# Patient Record
Sex: MALE | Race: WHITE
[De-identification: names, ages, dates, MRNs, and addresses within clinical notes are randomized per-mention and may not be internally consistent; named-entity substitution may affect disease eponyms.]

---

## 2019-12-23 ENCOUNTER — HOSPITAL ENCOUNTER (INPATIENT)
Dept: HOSPITAL 84 - D.PSYCH | Age: 84
LOS: 25 days | Discharge: NURSING, REHAB OR LONG-TERM CARE | DRG: 57 | End: 2020-01-17
Attending: PSYCHIATRY & NEUROLOGY | Admitting: PSYCHIATRY & NEUROLOGY
Payer: MEDICARE

## 2019-12-23 VITALS — BODY MASS INDEX: 21.4 KG/M2 | WEIGHT: 171.61 LBS | BODY MASS INDEX: 21.5 KG/M2

## 2019-12-23 DIAGNOSIS — R54: ICD-10-CM

## 2019-12-23 DIAGNOSIS — F41.8: ICD-10-CM

## 2019-12-23 DIAGNOSIS — N40.0: ICD-10-CM

## 2019-12-23 DIAGNOSIS — I48.91: ICD-10-CM

## 2019-12-23 DIAGNOSIS — K21.9: ICD-10-CM

## 2019-12-23 DIAGNOSIS — R63.0: ICD-10-CM

## 2019-12-23 DIAGNOSIS — K59.00: ICD-10-CM

## 2019-12-23 DIAGNOSIS — F02.81: ICD-10-CM

## 2019-12-23 DIAGNOSIS — M19.90: ICD-10-CM

## 2019-12-23 DIAGNOSIS — I12.9: ICD-10-CM

## 2019-12-23 DIAGNOSIS — N18.3: ICD-10-CM

## 2019-12-23 DIAGNOSIS — G30.1: Primary | ICD-10-CM

## 2019-12-23 DIAGNOSIS — G47.00: ICD-10-CM

## 2019-12-23 DIAGNOSIS — R13.10: ICD-10-CM

## 2019-12-24 VITALS — SYSTOLIC BLOOD PRESSURE: 148 MMHG | DIASTOLIC BLOOD PRESSURE: 82 MMHG

## 2019-12-24 VITALS — SYSTOLIC BLOOD PRESSURE: 158 MMHG | DIASTOLIC BLOOD PRESSURE: 78 MMHG

## 2019-12-24 VITALS — DIASTOLIC BLOOD PRESSURE: 93 MMHG | SYSTOLIC BLOOD PRESSURE: 174 MMHG

## 2019-12-24 LAB
ALBUMIN SERPL-MCNC: 3.1 G/DL (ref 3.4–5)
ALP SERPL-CCNC: 115 U/L (ref 46–116)
ALT SERPL-CCNC: 22 U/L (ref 10–68)
AMORPHOUS SEDIMENT: (no result) /LPF
ANION GAP SERPL CALC-SCNC: 17.4 MMOL/L (ref 8–16)
APPEARANCE UR: (no result)
BACTERIA #/AREA URNS HPF: (no result) /HPF
BASOPHILS NFR BLD AUTO: 0.3 % (ref 0–2)
BILIRUB SERPL-MCNC: 0.82 MG/DL (ref 0.2–1.3)
BILIRUB SERPL-MCNC: NEGATIVE MG/DL
BUN SERPL-MCNC: 37 MG/DL (ref 7–18)
CALCIUM SERPL-MCNC: 8.4 MG/DL (ref 8.5–10.1)
CHLORIDE SERPL-SCNC: 109 MMOL/L (ref 98–107)
CHOLEST/HDLC SERPL: 3.8 RATIO (ref 2.3–4.9)
CO2 SERPL-SCNC: 18 MMOL/L (ref 21–32)
COLOR UR: YELLOW
CREAT SERPL-MCNC: 1.7 MG/DL (ref 0.6–1.3)
EOSINOPHIL NFR BLD: 1.2 % (ref 0–7)
ERYTHROCYTE [DISTWIDTH] IN BLOOD BY AUTOMATED COUNT: 14.9 % (ref 11.5–14.5)
EST. AVERAGE GLUCOSE BLD GHB EST-MCNC: 103 MG/DL (ref 74–154)
GLOBULIN SER-MCNC: 3.7 G/L
GLUCOSE SERPL-MCNC: 75 MG/DL (ref 74–106)
GLUCOSE SERPL-MCNC: NEGATIVE MG/DL
GRAN CASTS #/AREA URNS LPF: (no result) /LPF
HCT VFR BLD CALC: 40.6 % (ref 42–54)
HDLC SERPL-MCNC: 26 MG/DL (ref 32–96)
HGB BLD-MCNC: 13.7 G/DL (ref 13.5–17.5)
IMM GRANULOCYTES NFR BLD: 0.9 % (ref 0–5)
KETONES UR STRIP-MCNC: (no result) MG/DL
LDL-HDL RATIO: 2.3 RATIO (ref 1.5–3.5)
LDLC SERPL-MCNC: 61 MG/DL (ref 0–100)
LYMPHOCYTES NFR BLD AUTO: 17.9 % (ref 15–50)
MCH RBC QN AUTO: 31.6 PG (ref 26–34)
MCHC RBC AUTO-ENTMCNC: 33.7 G/DL (ref 31–37)
MCV RBC: 93.5 FL (ref 80–100)
MONOCYTES NFR BLD: 6.7 % (ref 2–11)
MUCOUS THREADS #/AREA URNS LPF: (no result) /LPF
NEUTROPHILS NFR BLD AUTO: 73 % (ref 40–80)
NITRITE UR-MCNC: NEGATIVE MG/ML
OSMOLALITY SERPL CALC.SUM OF ELEC: 288 MOSM/KG (ref 275–300)
PH UR STRIP: 5 [PH] (ref 5–6)
PLATELET # BLD: 173 10X3/UL (ref 130–400)
PMV BLD AUTO: 9.5 FL (ref 7.4–10.4)
POTASSIUM SERPL-SCNC: 3.4 MMOL/L (ref 3.5–5.1)
PROT SERPL-MCNC: 6.8 G/DL (ref 6.4–8.2)
PROT UR-MCNC: NEGATIVE MG/DL
RBC # BLD AUTO: 4.34 10X6/UL (ref 4.2–6.1)
RBC #/AREA URNS HPF: (no result) /HPF (ref 0–5)
SODIUM SERPL-SCNC: 141 MMOL/L (ref 136–145)
SP GR UR STRIP: 1.01 (ref 1–1.02)
SQUAMOUS #/AREA URNS HPF: (no result) /HPF (ref 0–5)
TRIGL SERPL-MCNC: 59 MG/DL (ref 30–200)
TSH SERPL-ACNC: 5.16 UIU/ML (ref 0.36–3.74)
UROBILINOGEN UR-MCNC: NORMAL MG/DL
WBC # BLD AUTO: 9.7 10X3/UL (ref 4.8–10.8)
WBC #/AREA URNS HPF: (no result) /HPF

## 2019-12-24 NOTE — NUR
NEW ADMIT TO DOCTOR RODRIGUEZ FOR [facility] RELATED TO ALTERED MENTAL STATUS.
RECEIVED FROM [facility]. PATIENT REPORTEDLY COMBATIVE DURING TRANSPORT
 BUT CALM UPON ARRIVAL TO [facility]. PATIENT DAUGHT CALLED AND ADMIT CONSENT
RECEIVED. DNR STATUS RECEIVED. CODE WORD OF CRUISIN RECEIVED. PATIENT BECAUSE
INCREASING AGITATED AND ANXIOUS AFTER ARRIVAL. ATTEMPTS TO STAND WITHOUT
ASSIST. UNABLE TO REDIRECT. PRN ATIVAN 0.5 MG IM GIVEN FOR ANXIETY AND PRM
HALDOL 2 MG IM GIVEN FOR ONSET OF UNSAFE PSYCHOTIC BEHAVIOUR. CONTINUE TO
MONITOR FOR SAFETY.

## 2019-12-24 NOTE — NUR
B) Patient is alert and oriented to self, very needy and attention seeking,
calling out to staff constantly wanting someone to do something for him
constantly
I) Administered scheduled medications as ordered, redirected as needed,
R) Mediation compliant, intrusive and needy,
P) Continue plan of care.

## 2019-12-24 NOTE — NUR
PT IS AWAKE AND ALERT TO SELF ONLY. CALM AND COOPERATIVE WITH ASSESSMENT.
PRESCRIBED MEDS PROVIDED AS ORDERED. MED COMPLIANT. PT CAN BE VERY DEMANDING
AT TIMES WITH STAFF. PT YELLS OUT FOR NO REASON. HARD TO REDIRECT AT TIMES.
REDIRECT AND REORIENT AS NEEDED. FALL PRECAUTIONS IN PLACE. WILL CPOC.

## 2019-12-25 VITALS — SYSTOLIC BLOOD PRESSURE: 171 MMHG | DIASTOLIC BLOOD PRESSURE: 81 MMHG

## 2019-12-25 VITALS — DIASTOLIC BLOOD PRESSURE: 81 MMHG | SYSTOLIC BLOOD PRESSURE: 139 MMHG

## 2019-12-25 NOTE — NUR
The patient is confused. He has poor insight into his situation. He tries to
stand to walk, but he is unable to walk and he has fallen before coming to
[facility]. He asks a lot of questions and wants to be taken care of at that
time. He is in a edwin chair and needs assistance to transfer. Provide
prescribed meds. Continue POC.

## 2019-12-25 NOTE — NUR
The patient said he needed to go to the bathroom right now. Took the patient
to his room and he had explosive diarrhea in his pants and after he sat on the
toilet. Obtained a CDT sample and sent it to the lab.

## 2019-12-25 NOTE — PSY
PATIENT NAME:RAGHU TURNER                               MEDICAL RECORD: [id number]
: 30                                              LOCATION:[facility]
ADMISSION DATE: 19    ACCOUNT: [id number]
                                                           
PSYCHIATRIC EVALUATION
 
 
DATE OF EVALUATION: 19
 
 
IDENTIFYING DATA:  The patient is 89 years old and he is admitted to the
hospital on a voluntary basis.
 
CHIEF COMPLAINT:  Aggression.
 
HISTORY OF PRESENT ILLNESS:  The patient lives in a local nursing home and he
became combative and aggressive with staff.  The patient himself has no
recollection of this.  He was sent to the hospital for evaluation and treatment
of these symptoms and was very aggressive and disorganized and required p.r.n.
medication to calm him upon admission.  Today, he is more cooperative.  He is
certainly calm, but he is denying that he would seek to harm himself.  He also
has no thoughts of harming others and has no recollection for the circumstances
that brought him here.
 
PAST MEDICAL HISTORY:  Significant for hypertension, atrial fibrillation,
chronic back pain, benign prostatic hypertrophy.
 
PAST PSYCHIATRIC HISTORY:  Significant for an established diagnosis of dementia,
although I do not know who, when or how that diagnosis was made.
 
FAMILY HISTORY:  Unknown.
 
ALLERGIES:  AMBIEN, PENICILLIN AND BENADRYL.
 
CURRENT MEDICATIONS:  Include Atenolol, Levaquin, lisinopril, Remeron, Zoloft,
melatonin, and Mucinex.
 
SOCIAL HISTORY:  The patient is .  He does have adult children involved
with his care.  He is not able to provide much in the way of background history.
 He does not smoke.  He does drink, but he denies it was ever excessive.
 
MENTAL STATUS EXAMINATION:  The patient is awake, alert and oriented to person,
place and somewhat to time and situation.  His mood is flat.  His affect is
constricted.  Thought processes are circumstantial.  Memory, concentration, and
abstraction abilities are moderately impaired and he denies any intent to harm
himself or others as well as psychotic symptoms.
 
ASSETS:  Supportive family members.
 
LIABILITIES:  Limited insight.
 
DIAGNOSTIC IMPRESSION:
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
disturbances.
AXIS II:  None.
AXIS III:  Hypertension, atrial fibrillation, chronic back pain,
gastroesophageal reflux disease, benign prostatic hypertrophy.
AXIS IV:  Moderate.
 
AXIS V:  Global assessment of functioning is 30.
 
PLAN:  At this time, the patient is admitted to the hospital secondary to
aggressive behavior associated with a dementing illness.  He will be
comprehensively evaluated from both a medical, psychological, and social
standpoint.  His long-term prognosis is guarded.
 
TRANSINT:[id number] Voice Confirmation ID: [id number] DOCUMENT ID: [id number]
                                           
                                           FREDDIE RODRIGUEZ MD             
 
 
 
Electronically Signed by FREDDIE RODRIGUEZ on 19 at 0837
 
 
 
 
 
 
 
 
 
 
 
 
 
 
 
 
 
 
 
 
 
 
 
 
 
 
 
 
 
 
 
 
 
 
 
 
 
 
CC:                                                             [id number]
DICTATION DATE: 19 1345     :     19 1404      ADM IN  
                                                                              
[address] [place] [address]

## 2019-12-26 VITALS — SYSTOLIC BLOOD PRESSURE: 173 MMHG | DIASTOLIC BLOOD PRESSURE: 97 MMHG

## 2019-12-26 VITALS — DIASTOLIC BLOOD PRESSURE: 79 MMHG | SYSTOLIC BLOOD PRESSURE: 130 MMHG

## 2019-12-26 NOTE — PN
PATIENT:RAGHU TURNER                           MEDICAL RECORD: [id number]
                                                         LOCATION:[facility]
                                                         ADMISSION DATE: 12/23/19
 
PROGRESS NOTE
 
 
DATE OF SERVICE:  12/25/2019
 
SUBJECTIVE:  The patient's case was discussed with staff.  He has no new
complaint.
 
OBJECTIVE:  The patient is in good behavioral control.  He has limited insight
about his condition.  He tolerates his medicines well.  He is not eating
adequately and did not sleep well last night.
 
ASSESSMENT:  Dementia.
 
PLAN:  The patient is going to be started on Megace to assist with appetite
stimulation and I am going to give him trazodone to assist with sleep
consolidation.
 
TRANSINT:[id number] Voice Confirmation ID: [id number] DOCUMENT ID: [id number]
 
 
 
 
                                           
                                           FREDDIE RODRIGUEZ MD             
 
 
 
Electronically Signed by FREDDIE RODRIGUEZ on 12/26/19 at 1522
 
 
 
 
 
 
 
 
 
 
 
 
 
 
 
 
 
 
CC:                                                             [id number]
DICTATION DATE: 12/25/19 1018     :     12/25/19 1037      [facility] [place]  
                                                                              
[facility]                                          
[address] [place] [address]

## 2019-12-26 NOTE — NUR
PATIENT IS VERY CONFUSED, HOLLERING OUT, CAN BE LITTLE COMBATIVE AT TIMES, HE
IS DEMANDING, HE IS COMPLIANT WITH MEDS, NO SIDE EFFECTS NOTED. TOTALLY
DEPENDENT WITH CARE. CAN MAKE HIS NEEDS KNOWN. WILL FOLLOW POC

## 2019-12-26 NOTE — NUR
PATIENT CALM, CONFUSED, COOPERATIVE, DEMANDING AT TIMES, NO AGGRESSION NOTED,
YELLS AT TIMES,  MEDS ADMIN PER ORDERS WITH COMPLETE MED COMPLIANCE NOTED.
CONT POC AS DIRECTED.

## 2019-12-26 NOTE — NUR
Nutrition Follow-up:
Diet: Regular
PO intake: ~28% average x last 6 meals
Last BM: . WT: 154# (12/24/19)
Meds noted: megace, linzess. No new labs.
Continue Regular diet. Will add Ensure with meals. RD following.

## 2019-12-26 NOTE — NUR
PATIENT IS VERY CONFUSED, HOLLERS OUT, DEMANDING, COMPLIANT WITH MEDS, NO
ADVERSE REACTION TO MEDS NOTED, DEPENDENT UPON STAFF FOR CARE. WILL FOLLOW POC

## 2019-12-27 VITALS — DIASTOLIC BLOOD PRESSURE: 92 MMHG | SYSTOLIC BLOOD PRESSURE: 174 MMHG

## 2019-12-27 VITALS — DIASTOLIC BLOOD PRESSURE: 83 MMHG | SYSTOLIC BLOOD PRESSURE: 152 MMHG

## 2019-12-27 LAB
T4 SERPL-MCNC: 6.7 UG/DL (ref 4.7–13.3)
TSH SERPL-ACNC: 2.48 UIU/ML (ref 0.36–3.74)

## 2019-12-27 NOTE — PN
PATIENT:RAGHU TURNER                           MEDICAL RECORD: [id number]
                                                         LOCATION:[facility]
                                                         ADMISSION DATE: 12/23/19
 
PROGRESS NOTE
 
 
DATE OF SERVICE:  12/26/2019
 
SUBJECTIVE:  The patient's case was discussed with staff.  He has no new
complaint.
 
OBJECTIVE:  The patient is in good behavioral control with limited insight about
his condition.  He is not sleeping adequately and he is not eating adequately.
 
ASSESSMENT:  Dementia.
 
PLAN:  I am going to continue the patient on the Megace for its appetite
stimulating properties and will increase the dose of the trazodone.
 
TRANSINT:[id number] Voice Confirmation ID: [id number] DOCUMENT ID: [id number]
 
 
 
 
                                           
                                           FREDDIE RODRIGUEZ MD             
 
 
 
Electronically Signed by FREDDIE RODRIGUEZ on 12/27/19 at 1414
 
 
 
 
 
 
 
 
 
 
 
 
 
 
 
 
 
 
 
 
CC:                                                             [id number]
DICTATION DATE: 12/26/19 1550     :     12/27/19 0125      ADM IN  
                                                                              
[facility]                                          
[address] [id number]

## 2019-12-27 NOTE — NUR
The patient remains sleepy this pm, he awakened to let staff he had to go to
the bathroom and then he fell back to sleep. He ate a few bites of dinner and
went to sleep eating. No aggression at this time.

## 2019-12-28 VITALS — SYSTOLIC BLOOD PRESSURE: 127 MMHG | DIASTOLIC BLOOD PRESSURE: 67 MMHG

## 2019-12-28 VITALS — DIASTOLIC BLOOD PRESSURE: 84 MMHG | SYSTOLIC BLOOD PRESSURE: 151 MMHG

## 2019-12-28 NOTE — NUR
RECEIVED IN DAYROOM. SITTING IN A CHAIR WITH PEERS AT HIS SIDE. CALM AND
COOPERATIVE WITH CARE AND ASSESSMENT. NO SIGNS OF AGGRESSION. CONTINUES TO SIT
CALMLY IN DAYROOM. CONTINUE PLAN OF CARE

## 2019-12-28 NOTE — NUR
Offered the patient his lunch and offered to feed him, but he said "I need
someone to massage my hand." He refused to eat at this time.

## 2019-12-28 NOTE — NUR
The patient is sitting in the hallway screaming for water. Provided the
patient two cups of water and he then continues to scream for water. He is in
a edwin chair and asking staff to get him up so that he can walk. Provide
prescribed meds. The patient will require crushed meds in pudding or food.
Continue POC.

## 2019-12-28 NOTE — NUR
Offered to feed the patient, he refused, he said "I want you to move my hand."
He did not want to be assisted to eat, he is drinking his ensure.

## 2019-12-28 NOTE — PN
PATIENT:RAGHU TURNER                           MEDICAL RECORD: [id number]
                                                         LOCATION:[facility]
                                                         ADMISSION DATE: 12/23/19
 
PROGRESS NOTE
 
 
DATE OF SERVICE:  12/27/2019
 
SUBJECTIVE:  The patient's case was discussed with staff.  He has no new
complaint.
 
OBJECTIVE:  The patient is disorganized.  He was quite agitated last night and
received p.r.n. Haldol and Ativan.  He has no recollection of this.  He is
clearly quite confused and easily agitated for reasons that are not entirely
clear.
 
ASSESSMENT:  Dementia.
 
PLAN:  I am going to start the patient on a scheduled dose of Trilafon at
bedtime.  Trilafon is being used to help with his agitated behavior.  He will be
monitored for clinical changes associated with its use.
 
TRANSINT:[id number] Voice Confirmation ID: [id number] DOCUMENT ID: [id number]
 
 
 
 
                                           
                                           FREDDIE RODRIGUEZ MD             
 
 
 
Electronically Signed by FREDDIE RODRIGUEZ on 12/28/19 at 1305
 
 
 
 
 
 
 
 
 
 
 
 
 
 
 
 
 
CC:                                                             [id number]
DICTATION DATE: 12/27/19 1559     :     12/28/19 0201      ADM IN  
                                                                              
[id number] [place] [id number]

## 2019-12-29 VITALS — SYSTOLIC BLOOD PRESSURE: 123 MMHG | DIASTOLIC BLOOD PRESSURE: 65 MMHG

## 2019-12-29 VITALS — SYSTOLIC BLOOD PRESSURE: 101 MMHG | DIASTOLIC BLOOD PRESSURE: 60 MMHG

## 2019-12-29 NOTE — NUR
pt was eating dinner when he became choked. mht sat pt up and ceased feeding.
pt took couple of sips of water per mht and did not swallow any of his water.
nurse noted clear lung sounds bilateral anterior and posteior. will cont to
monitor.

## 2019-12-29 NOTE — NUR
RECEIVED IN DAYROOM. RESTING IN RECLINER WITH EYES OPEN. CALM AND COOPERATIVE
WITH CARE AND ASSESSMENT. NO AGGRESSIVE BEHAVIOR. REDIRECT AND REORIENT AS
NEEDED. WATCHING TV AT THIS TIME. CONTINUE PLAN OF CARE.

## 2019-12-29 NOTE — NUR
PT SITTING IN RECINLER CHAIR AT THIS TIME. NO ACUTE DISTRESS NOTED. PT DOES
YELL OUT AT TIMES. REDIRECT AND REORIENT AS NEEDED. PT IS ALERT CONFUSED AND
ORIENTED TO SELF ONLY. NO AGGRESSIVE BEHAVIOR NOTED AT THIS TIME. PT CAN MAKE
SOME NEEDS KNOWN. PT COOPERATIVE WITH STAFF WITH ASSESSMENTS, VITALS AND MEDS.
CHAIR ALARM IN PLACE AND ACTIVE. WILL CONT PLAN OF CARE.

## 2019-12-29 NOTE — NUR
nurse applied barrier cream to front periarea and to head glans of penis.
foreskin pulled down as well. applied cream to back periarea.

## 2019-12-29 NOTE — NUR
SPOKE WITH DR. CARDENAS ABOUT PT URINE RESULTS. NO GROWTH AT DAY ONE. NO
SIGNIFICANT ABNORMALITIES NOTED IN URINE CULTURE. NO FEVER OR S/SX NOTED. NO
NEW ORDER AT THIS TIME.

## 2019-12-29 NOTE — NUR
SPOKE WITH PT SET DAUGHTER AT THIS TIME. PASSCODE GIVEN. SHE ASKED IF SHE
THOUGHT PT WAS OKAY FOR VISITORS TODAY. NURSE INQUIRED WITH OTHER NURSE. PT
BEHAVIOR HAS BEEN GOOD THIS SHIFT. VISITORS ALLOWED.

## 2019-12-30 VITALS — SYSTOLIC BLOOD PRESSURE: 127 MMHG | DIASTOLIC BLOOD PRESSURE: 56 MMHG

## 2019-12-30 VITALS — DIASTOLIC BLOOD PRESSURE: 82 MMHG | SYSTOLIC BLOOD PRESSURE: 146 MMHG

## 2019-12-30 NOTE — NUR
RECEIVED IN DAYROOM. SITTING IN A RECLINER WITH STAFF BY HIS SIDE. CALM AND
COOPERATIVE WITH CARE AND ASSESSMENT. NO SIGNS OF AGGRESSION. REDIRECT AND
REORIENT AS NEEDED. RESTING IN BED WITH EYES CLOSED AT THIS TIME. CONTINUE
PLAN OF CARE

## 2019-12-30 NOTE — NUR
ALERT, CALM, COOPERATIVE, NO BEHAVIORAL ISSUES NOTED AT THIS TIME.  MEDS ADMIN
PER ORDERS WITH COMPLETE MED COMPLIANCE NOTED.  CONT POC AS DIRECTED.

## 2019-12-30 NOTE — PN
PATIENT:RAGHU TURNER                           MEDICAL RECORD: [id number]
                                                         LOCATION:[facility]
                                                         ADMISSION DATE: 12/23/19
 
PROGRESS NOTE
 
 
DATE OF SERVICE:  12/28/2019
 
SUBJECTIVE:  The patient's case was discussed with staff.  He has no new
complaint.
 
OBJECTIVE:  The patient is in good behavioral control with limited insight about
his situation.
 
ASSESSMENT:  Dementia.
 
PLAN:  Brief supportive and educational interventions were made.  Long-term
prognosis is guarded.
 
TRANSINT:[id number] Voice Confirmation ID: [id number] DOCUMENT ID: [id number]
 
 
 
 
                                           
                                           FREDDIE RODRIGUEZ MD             
 
 
 
Electronically Signed by FREDDIE RODRIGUEZ on 12/30/19 at 1349
 
 
 
 
 
 
 
 
 
 
 
 
 
 
 
 
 
 
 
 
CC:                                                             [id number]
DICTATION DATE: 12/28/19 1405     :     12/28/19 1456      ADM IN  
                                                                              
[address] [place] [address]

## 2019-12-30 NOTE — PN
PATIENT:RAGHU TURNER                           MEDICAL RECORD: [id number]
                                                         LOCATION:[facility]
                                                         ADMISSION DATE: 12/23/19
 
PROGRESS NOTE
 
 
DATE OF SERVICE:  12/29/2019
 
SUBJECTIVE:  The patient's case was discussed with staff.  He has no new
complaint.
 
OBJECTIVE:  The patient is in good behavioral control.  He is eating and
sleeping reasonably well.  He has not been aggressive.  He is only partially
oriented and so far he has tolerated his medicines well.
 
ASSESSMENT:  Dementia.
 
PLAN:  Current medicines have been reviewed and will be maintained.  I
anticipate he can be transitioned out of the hospital soon if this level of
improvement continues.
 
TRANSINT:[id number] Voice Confirmation ID: [id number] DOCUMENT ID: [id number]
 
 
 
 
                                           
                                           FREDDIE RODRIGUEZ MD             
 
 
 
Electronically Signed by FREDDIE RODRIGUEZ on 12/30/19 at 1349
 
 
 
 
 
 
 
 
 
 
 
 
 
 
 
 
 
 
CC:                                                             [id number]
DICTATION DATE: 12/29/19 1239     :     12/29/19 1333      ADM IN  
                                                                              
[facility]                                          
[address] [place] [address]

## 2019-12-31 VITALS — SYSTOLIC BLOOD PRESSURE: 129 MMHG | DIASTOLIC BLOOD PRESSURE: 69 MMHG

## 2019-12-31 VITALS — DIASTOLIC BLOOD PRESSURE: 70 MMHG | SYSTOLIC BLOOD PRESSURE: 130 MMHG

## 2019-12-31 NOTE — NUR
ALERT, CALM, COOPERATIVE, DEMANDING, NO AGGRESSION, CONT CONFUSION.  COMPLIANT
WITH MEDS, COOPERATIVE WITH PLAN OF CARE.  CONT POC AS DIRECTED.

## 2019-12-31 NOTE — PN
PATIENT:RAGHU TURNER                           MEDICAL RECORD: [id number]
                                                         LOCATION:[facility]
                                                         ADMISSION DATE: 12/23/19
 
PROGRESS NOTE
 
 
DATE OF SERVICE:  12/30/2019
 
SUBJECTIVE:  The patient's case was discussed with staff.  He has no new
complaint.
 
OBJECTIVE:  The patient denies intent to harm himself or others.  He is
tolerating his medicines well.  He is not overtly aggressive today.  He does
have some irritability.  Unfortunately, he is not sleeping adequately, but he is
receiving Megace to assist with appetite stimulation.
 
TRANSINT:[id number] Voice Confirmation ID: [id number] DOCUMENT ID: [id number]
 
 
 
 
                                           
                                           FREDDIE RODRIGUEZ MD             
 
 
 
Electronically Signed by FREDDIE RODRIGUEZ on 12/31/19 at 1510
 
 
 
 
 
 
 
 
 
 
 
 
 
 
 
 
 
 
 
 
 
 
 
CC:                                                             [id number]
DICTATION DATE: 12/30/19 1557     :     12/30/19 2300      ADM IN  
                                                                              
[facility]                                          
[address] [place] [address]

## 2020-01-01 VITALS — SYSTOLIC BLOOD PRESSURE: 127 MMHG | DIASTOLIC BLOOD PRESSURE: 72 MMHG

## 2020-01-01 VITALS — DIASTOLIC BLOOD PRESSURE: 64 MMHG | SYSTOLIC BLOOD PRESSURE: 122 MMHG

## 2020-01-01 NOTE — NUR
B) Patient is alert and oriuented to person and place, restless and anxious,
demanding and yelling out
I) Administered scheduled medications as ordered, PRN Ativan 0.5 mg IM and
Haldol 2 mg IM at 19:59,
R) Medication compliant, resting quietly in his bed now.
P) Continue plan of care.

## 2020-01-01 NOTE — PN
PATIENT:RAGHU TURNER                           MEDICAL RECORD: [id number]
                                                         [facility]
                                                         ADMISSION DATE: 12/23/19
 
PROGRESS NOTE
 
 
DATE OF SERVICE:  12/31/2019
 
SUBJECTIVE:  The patient's case was discussed with staff.  He has no new
complaint.
 
OBJECTIVE:  The patient is disorganized, but less agitated than he has been.  He
is easily angered, but can be redirected.  I think that this does represent an
improvement in his baseline level of functioning from when he initially
presented here; however, it is not going to be practical on a long-term basis
for him to always have staff in the room present with him.  I do think that he
is a potentially labile individual who will require high degree of supervision. 
I am going to start him on Namenda today.  Hopefully, that will improve his
cognitive processes some.
 
TRANSINT:[id number] Voice Confirmation ID: [id number] DOCUMENT ID: [id number]
 
 
 
 
                                           
                                           FREDDIE RODRIGUEZ MD             
 
 
 
Electronically Signed by FREDDIE RODRIGUEZ on 01/01/20 at 0941
 
 
 
 
 
 
 
 
 
 
 
 
 
 
 
 
 
 
 
CC:                                                             [id number]
DICTATION DATE: 12/31/19 1553     :     01/01/20 0400      ADM IN  
                                                                              
[facility]                                          
[address] [place] [address]

## 2020-01-01 NOTE — NUR
RECEIVED IN DAYROOM. SITTING IN A RECLINING CHAIR. CALM AND COOPERATIVE WITH
CARE AND ASSESSMENT. NO SIGNS OF AGGRESSION. REDIRECT AND REORIENT AS NEEDED.
RESTING IN A RECLINING CHAIR OUTSIDE OF NURSES STATION AT THIS TIME. CONTINUE
PLAN OF CARE

## 2020-01-02 VITALS — SYSTOLIC BLOOD PRESSURE: 153 MMHG | DIASTOLIC BLOOD PRESSURE: 87 MMHG

## 2020-01-02 NOTE — NUR
The patient is awake he has poor insight into his situation. He has not been
yelling out as much today. He has been trying to eat and drink. He remains
confused, but he has not shown any aggression today. The patient needs assist
with transfers. He is sitting in a edwin chair. He tries to stand up on his
own. Provide prescribed meds. The patient is compliant with meds. Continue
POC.

## 2020-01-02 NOTE — PN
PATIENT:RAGHU TURNER                           MEDICAL RECORD: [id number]
                                                         [facility]
                                                         ADMISSION DATE: 12/23/19
 
PROGRESS NOTE
 
 
DATE OF SERVICE:  01/01/2020
 
SUBJECTIVE:  The patient's case was discussed with staff.  He has no new
complaint.
 
OBJECTIVE:  The patient is in good behavioral control today, but last night he
required p.r.n. medication because of his agitation.  In questioning him about
the events, he has no recollection of what happened.  Based on this, I am going
to discontinue the Trilafon and we will start him on a scheduled dose of Geodon
for his agitation.  I am going to give him 20 mg at bedtime.
 
TRANSINT:[id number] Voice Confirmation ID: [id number] DOCUMENT ID: [id number]
 
 
 
 
                                           
                                           FREDDIE RODRIGUEZ MD             
 
 
 
Electronically Signed by FREDDIE RODRIGUEZ on 01/02/20 at 1421
 
 
 
 
 
 
 
 
 
 
 
 
 
 
 
 
 
 
 
 
 
 
CC:                                                             [id number]
DICTATION DATE: 01/01/20 1032     :     01/01/20 1300      ADM IN  
                                                                              
[id number] [place] [id number]

## 2020-01-02 NOTE — NUR
RECEIVED PATIENT SITTING IN A CHAIR IN THE DAYROOM. PATIENT PULLS HIS CLOTHES
OFF. YELLS AND CURSES. DEMANDING AND WILL TELL YOU TO LEAVE AND NEVER COME
BACK. DISRUPTIVE TO UNIT. ADMINISTER MEDS AND MONITOR COMPLIANCE. REDIRECT FOR
DISRUPTIVE BEHAVIORS. MED COMPLIANT. POOR REDIRECTION DUE TO IMPAIRED ABILITY
TO PROCESS INFORMATION. CONTINUE POC AND PROVIDE SAFE ENVIRONMENT.

## 2020-01-02 NOTE — NUR
IN [place] YELLING OUT LOUDLY. ATTEMPTS TO STAND WITHOUT ASSIST. VERY CONFUSED.
 INCREASING ANXIETY. PRN ATIVAN 0.5 MG GIVEN FOR ANXIETY. PRN HALDOL 2 MG IM
GIVEN FOR ONSET OF UNSAFE PSYCHOTIC BEHAVIOR. CONTINUE TO MONITOR FOR SAFETY.

## 2020-01-02 NOTE — NUR
Nutrition Follow-up:
Diet: Regular [facility] Soft with Chopped Meats + Ensure with and inbetween meals
PO intake: ~59% average x last 9 meals
Last BM: 1/1/20 x 2. WT: 148.8# (12/23/19); refused weekly wts this week.
Meds noted: megace, linzess. no new labs.
Continue current diet and appetite stimulant. Encourage PO intake. RD
following.

## 2020-01-03 VITALS — DIASTOLIC BLOOD PRESSURE: 82 MMHG | SYSTOLIC BLOOD PRESSURE: 151 MMHG

## 2020-01-03 VITALS — DIASTOLIC BLOOD PRESSURE: 60 MMHG | SYSTOLIC BLOOD PRESSURE: 133 MMHG

## 2020-01-03 VITALS — SYSTOLIC BLOOD PRESSURE: 94 MMHG | DIASTOLIC BLOOD PRESSURE: 58 MMHG

## 2020-01-03 NOTE — NUR
A phone call was made from a male that says he is a friend of Mr. Everton Franco, but he did not have the code word. Explained to him that without a
code word we can not confirm or deny the patient is here. he was very
persistant saying he knew him for fifty years. Called the patient's daughter
to let her be aware.

## 2020-01-03 NOTE — PN
PATIENT:RAGHU TURNER                           MEDICAL RECORD: [id number]
                                                         LOCATION:[facility]
                                                         ADMISSION DATE: 12/23/19
 
PROGRESS NOTE
 
 
DATE OF SERVICE:  01/02/2020
 
SUBJECTIVE:  The patient's case was discussed with staff.  He has no new
complaint.
 
OBJECTIVE:  The patient required p.r.n. medication last night because of some
agitation.  He has pretty limited insight about his situation.  Today, he is
fairly sleepy.
 
ASSESSMENT:  Dementia.
 
PLAN:  I am going to increase the patient's nighttime dose of trazodone to
assist with sleep consolidation.  Efforts now will be made to help him get back
on an appropriate day-night schedule.  His long-term prognosis is guarded.
 
TRANSINT:[id number] Voice Confirmation ID: [id number] DOCUMENT ID: [id number]
 
 
 
 
                                           
                                           FREDDIE RODRIGUEZ MD             
 
 
 
Electronically Signed by FREDDIE RODRIGUEZ on 01/03/20 at 1620
 
 
 
 
 
 
 
 
 
 
 
 
 
 
 
 
 
 
CC:                                                             [id number]
DICTATION DATE: 01/02/20 1615     :     01/03/20 0329      ADM IN  
                                                                              
[place] [id number] [place] [id number]

## 2020-01-03 NOTE — NUR
The patient is awake he has moments where he yells out and he has tried to get
up out of his edwin chair, he sleeps intermittantly. He has not shown any
aggression today, but he is confused and he has poor insight into his reality.
Provide prescribed meds. The patient is compliant with meds. Continue POC.

## 2020-01-04 VITALS — DIASTOLIC BLOOD PRESSURE: 85 MMHG | SYSTOLIC BLOOD PRESSURE: 141 MMHG

## 2020-01-04 VITALS — DIASTOLIC BLOOD PRESSURE: 57 MMHG | SYSTOLIC BLOOD PRESSURE: 119 MMHG

## 2020-01-04 NOTE — NUR
FAMILY HERE FOR VISIT, ALERT, CALM, COOPERATIVE, QUITE DEMANDING AT TIMES.
MEDS ADMIN PER ORDERS WITH COMPLETE MED COMPLIANCE NOTED.  COOPERATIVE WITH
POC, CONT POC AS DIRECTED.

## 2020-01-04 NOTE — NUR
B) Patient is alert and oriented to person, demanding and attention seeking at
times, calling out 'help me' anytime he sees staff
I) Administered scheduled medications as ordered, redirected as ordered,
R) Medication compliant, resting quietly now
P) Continue plan of care.

## 2020-01-04 NOTE — NUR
B.) PT IS ALERT AND ORIENTED TO SELF ONLY. HE IS RECEIVED IN HIS MARC-CHAIR.
HE IS NEEDY AT TIMES BUT PLEASANT WITH STAFF. HE IS NOT YELLING OUT AS HE
USUALLY DOES.
I.) PROVIDED PM MEDICATIONS. REDIRECT OFTEN.
R.) COMPLIANT WITH ALL MEDICATIONS. DIFFICULT TO REDIRECT.
P.) WILL CONTINUE TO MONITOR.

## 2020-01-04 NOTE — PN
PATIENT:RAGHU TURNER                           MEDICAL RECORD: [id number]
                                                         [facility]ATION[facility]
                                                         ADMISSION DATE: 12/23/19
 
PROGRESS NOTE
 
 
DATE OF SERVICE:  01/03/2020
 
SUBJECTIVE:  The patient's case was discussed with staff.  He has no new
complaint.
 
OBJECTIVE:  The patient is in good behavioral control, although he has been
restless.  He is not eating or sleeping very well, but I am viewing yesterday as
an isolated event.  Since he had been eating and sleeping better on the whole.
 
ASSESSMENT:  Dementia.
 
PLAN:  The patient has improved.  I think if this level of improvement
continues, he can reasonably be discharged after the weekend.
 
TRANSINT:[id number] Voice Confirmation ID: [id number] DOCUMENT ID: [id number]
 
 
 
 
                                           
                                           FREDDIE RODRIGUEZ MD             
 
 
 
Electronically Signed by FREDDIE RODRIGUEZ on 01/04/20 at 1358
 
 
 
 
 
 
 
 
 
 
 
 
 
 
 
 
 
 
 
CC:                                                             [id number]
DICTATION DATE: 01/03/20 1701     :     01/03/20 1847      ADM IN  
                                                                              
[facility]                                          
[address] [place] [address]

## 2020-01-04 NOTE — NUR
PT YELLING "GET ME OUT OF HERE." SPOKE WITH PATIENT AND EXPLAINED WHERE HE
 AND TO NOT BE YELLING BECAUSE PATIENT'S AROUND HIM COULD NOT SLEEP. PT
ASKED IF I COULD GIVE HIM A TRANQUILIZER. ATIVAN PO ADMINISTERED FOR ANXIETY
AND PT REQUEST.

## 2020-01-05 VITALS — DIASTOLIC BLOOD PRESSURE: 56 MMHG | SYSTOLIC BLOOD PRESSURE: 108 MMHG

## 2020-01-05 VITALS — DIASTOLIC BLOOD PRESSURE: 76 MMHG | SYSTOLIC BLOOD PRESSURE: 138 MMHG

## 2020-01-05 NOTE — PN
PATIENT:RAGHU TURNER                           MEDICAL RECORD: [id number]
                                                         [facility]ATION[facility]
                                                         ADMISSION DATE: 12/23/19
 
PROGRESS NOTE
 
 
DATE OF SERVICE:  01/04/2020
 
SUBJECTIVE:  The patient's case was discussed with staff.  He has no new
complaint.
 
OBJECTIVE:  The patient is in good behavioral control.  He has limited insight
about his situation.
 
ASSESSMENT:  No change in diagnoses.
 
PLAN:  Brief supportive and educational interventions were made.  Long-term
prognosis is guarded.
 
TRANSINT:[id number] Voice Confirmation ID: [id number] DOCUMENT ID: [id number]
 
 
 
 
                                           
                                           FREDDIE RODRIGUEZ MD             
 
 
 
Electronically Signed by FREDDIE RODRIGUEZ on 01/05/20 at 1117
 
 
 
 
 
 
 
 
 
 
 
 
 
 
 
 
 
 
 
 
CC:                                                             [id number]
DICTATION DATE: 01/04/20 1442     :     01/04/20 1538      ADM IN  
                                                                              
[place] [id number] [place] [address]

## 2020-01-05 NOTE — NUR
PT SCREAMING AND ATTEMPTING TO THROW HIMSELF INTO THE FLOOR OF HIS ROOM HE IS
DIFFICULT TO REDIRECT. PRN ATIVAN 0.5 MG IM ADMINISTERED TO LEFT VASTUS
LATERALIS. WILL CONTINUE TO MONITOR.

## 2020-01-05 NOTE — NUR
Patient:   BENIGNO BAINS            MRN: [id number]            FIN: [id number]              Age:   79 years     Sex:  FEMALE     :  39   Associated Diagnoses:   None   Author:   TAYLOR BEAUCHAMP   late entry, pt was seen and examined this afternoon. daughter was at bedside. daughter says pt's mental status is at her baseline. she says pt is much weaker than her baseline. she says at home pt could walk. today pt able to follow simple commands. a/o to person, she recognizes her daughter. she denies headache, no abd pain. oral intake seems better. pt had bm yesterday.  tele reviewed     History of Present Illness   NOTES not really eating. arousable, but confused. does not seem to be in pain. no acute events per nursing. .       Physical Examination   VS/Measurements     Vitals between:   2019 15:45:51   TO   2019 15:45:51                   LAST RESULT MINIMUM MAXIMUM  Temperature 36.6 36.4 36.6  Heart Rate 65 63 74  Respiratory Rate 18 18 18  NISBP           145 127 177  NIDBP           76 59 79  NIMBP           99 83 110  SpO2                    97 95 97    General:  No acute distress, a/o to person.    Eye:  pt uncooperative for eye exam, pupils appear equal in size.   Neck:  Supple, No jugular venous distention.    Respiratory:  Respirations are non-labored, BS very diminished b/l.   Cardiovascular:  Normal rate, Regular rhythm, No murmur, Good pulses equal in all extremities, No edema.   Gastrointestinal:  Soft, Non-tender, Non-distended.    Integumentary:  Warm, Dry.    Neurologic:  easily arousable, oriented to person, follows simple commands. no facial droop. moving all extremities but left side weaker than right..   Psychiatric:  calm.      Review / Management   Laboratory results:     Labs between:  2019 15:45 to 2019 15:45  BMP:                 Na  Cl  BUN  Glu   2019 141  (H) 112  12  (H) 111                              K  CO2  Cr   PATIENT HAS BEEN CALM THUS FAR THIS SHIFT, RESTING CALMLY IN RECLINER,
CONT. VERY
CONFUSED AND DELUSIONAL.  STATED, "HELP! WE MUST HURRY AND GO TO [employer]!" MEDS
ADMIN THIS A.M. AS ORDERED WITH COMPLETE MED COMPLIANCE NOTED,  COOPERATIVE
WITH STAFF, NO AGGRESSION NOTED. Ca                              4.0  25  0.63  8.6                  .      Impression and Plan   eeg 11-1: IMPRESSION:  This EEG is an abnormal study recorded in the obtunded state. Continuous slowing over the right frontal lobe consistent with structural lesion. Diffuse background slowing of intermixed theta and occasional delta is consistent with moderate encephalopathy. No interictal, lateralized slowing or seizure were recorded. 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 NS signed off, pt needs to f/u w/ NS in 2 weeks.  Cont neurochecks q 4 hr  EEG repeated, did not show seizure. con't keppra per neuro recs  Avoid sedating meds (if necessary use seroquel >haldol per neuro recs)  BP goal <160 per neuro and NS team, increase amlodipine to 5 mg daily, losartan. hold home bb due to borderline hr. con't to monitor on tele.  con't PTU for thyroid d/o  con't ivf for now. stop ivf when oral intake improves/is consistent.  nutrition following, f/u calorie count results.   pt taking her oral meds well. removed ng tube per daughter's wishes and monitor oral intake.  DVT proph - ok for lovenox per neuro and neurosurg  con't pt/ot/st  seen by speech, ok for dysphagia I diet with honey thick liquids. video swallow tomorrow  con't home carbidopa-levodopa  dispo- SW following to help find snf placement. daughter would like trial w/out ng tube. monitor oral intake and labs. expect dc to snf in next 1-2 days if oral intake is appropriate and if bp controlled. Would like 6W evaluation.  Full Code,  consider palliative care consult to discuss goals of care if pt unable to eat/drink sufficient amount  PCP- per daughter, none currently, would like to follow with Assyrian speaking MD after discharge (ie, Noelle or Jennie)  Dalila José MD   [facility]

## 2020-01-05 NOTE — NUR
RECEIVED IN DAYROOM. SITTING IN A RECLINING CHAIR WITH PEERS AT HIS SIDE. CALM
AND COOPERATIVE WITH CARE AND ASSESSMENT. NO SIGNS OF AGGRESSION. REDIRECT AND
REORIENT AS NEEDED. CONTINUES TO REST QUIETLY AT THIS TIME. CONTINUE PLAN OF
CARE

## 2020-01-06 VITALS — SYSTOLIC BLOOD PRESSURE: 137 MMHG | DIASTOLIC BLOOD PRESSURE: 81 MMHG

## 2020-01-06 VITALS — SYSTOLIC BLOOD PRESSURE: 121 MMHG | DIASTOLIC BLOOD PRESSURE: 75 MMHG

## 2020-01-06 NOTE — NUR
Nutrition Follow-up:
Diet: Regular [facility] Soft with Chopped meats + Ensure with meals and inbetween
PO intake: ~55% x last 9 meals
Last BM: 1/6/20. WT: 150.4# (1/5/20); Admit wt: 148# (12/24/19)
Meds noted: linzess, megace. No new labs.
Continue current diet and oral nutrition supplement. Continue megace as
medically feasible. Encourage PO intake. RD following.

## 2020-01-06 NOTE — PN
PATIENT:RAGHU TURNER                           MEDICAL RECORD: [id number]
                                                         [facility]ATION[facility]
                                                         ADMISSION DATE: 12/23/19
 
PROGRESS NOTE
 
 
DATE OF SERVICE:  01/05/2020
 
SUBJECTIVE:  The patient's case was discussed with staff.  He has no new
complaint.
 
OBJECTIVE:  The patient received some p.r.n. Ativan last night for agitation. 
He seems better today.  He is disorganized, but not openly aggressive.
 
ASSESSMENT:  Dementia.
 
PLAN:  Current medicines and therapies have been reviewed and will be
maintained.  Long-term prognosis is guarded.
 
TRANSINT:[id number] Voice Confirmation ID: [id number] DOCUMENT ID: [id number]
 
 
 
 
                                           
                                           FREDDIE RODRIGUEZ MD             
 
 
 
Electronically Signed by FREDDIE RODRIGUEZ on 01/06/20 at 1400
 
 
 
 
 
 
 
 
 
 
 
 
 
 
 
 
 
 
 
 
CC:                                                             [id number]
DICTATION DATE: 01/05/20 1210     :     01/05/20 1308      ADM IN  
                                                                              
[address] [id number] [place] [address]

## 2020-01-06 NOTE — NUR
B.) PT IS ALERT AND ORIENTED TO SELF ONLY. HE IS RECEIVED IN HIS MARC-CHAIR IN
THE DAYROOM. HE IS DEMANDING STAFF TO STOP WHAT THEY WERE DOING IN ORDER TO
SIT WITH HIM.
I.) PROVIDED PM MEDICATION. REDIRECT OFTEN.
R.) COMPLIANT WITH ALL MEDICATIONS. DIFFICULT TO REDIRECT.
P.) WILL CONTINUE TO MONITOR.

## 2020-01-06 NOTE — NUR
PT SITTING IN RECLINING CHAIR IN DAYRROM WITH PEERS. CALM AND COOPERATIVE WITH
ASSESSMENT. PRESCRIBED MEDS PROVIDED AS ORDERED. MED COMPLIANT. REDIRECT AND
REORIENT AS NEEDED. FALL PRECAUTIONS IN PLACE. WILL CPOC.

## 2020-01-07 VITALS — SYSTOLIC BLOOD PRESSURE: 151 MMHG | DIASTOLIC BLOOD PRESSURE: 81 MMHG

## 2020-01-07 VITALS — SYSTOLIC BLOOD PRESSURE: 160 MMHG | DIASTOLIC BLOOD PRESSURE: 70 MMHG

## 2020-01-07 NOTE — NUR
RECEIVED IN DAYROOM. SITTING IN A RECLINER WITH PEERS AT HIS SIDE. RESTLESS AT
TIMES. CALM AND COOPERATIVE WITH CARE AND ASSESSMENT. NO SIGNS OF AGGRESSION.
REDIRECT AND REORIENT AS NEEDED. CONTINUES TO SIT IN RECLINER IN DAYROOM.
CONTINUE PLAN OF CARE

## 2020-01-07 NOTE — PN
PATIENT:RAGHU TURNER                           MEDICAL RECORD: [id number]
                                                         [facility]ATION:[facility]
                                                         ADMISSION DATE: 12/23/19
 
PROGRESS NOTE
 
 
DATE OF SERVICE:  01/06/2020
 
SUBJECTIVE:  The patient's case was discussed with staff.  He has no new
complaint.
 
OBJECTIVE:  The patient is a little sedated, but arousable.  Nursing staff tells
me he has been asleep through much of the day.  He has not responded very well
to the Geodon and that he keeps having agitation, particularly at night.  I am
going to try and manage him on a scheduled dose of a benzodiazepine.  Hopefully,
that will improve his agitation and he will not require p.r.n. medicines on a
regular basis, especially since they are so sedating even though they are fairly
small in dose.
 
TRANSINT:[id number] Voice Confirmation ID: [id number] DOCUMENT ID: [id number]
 
 
 
 
                                           
                                           FREDDIE RODRIGUEZ MD             
 
 
 
Electronically Signed by FREDDIE RODRIGUEZ on 01/07/20 at 1235
 
 
 
 
 
 
 
 
 
 
 
 
 
 
 
 
 
 
 
 
CC:                                                             [id number]
DICTATION DATE: 01/06/20 1520     :     01/06/20 1841      ADM IN  
                                                                              
[facility]                                          
[address] [place] [address]

## 2020-01-07 NOTE — NUR
PT IS AWAKE AND ALERT TO PERSON ONLY. PRESCRIBED MEDS PROVIDED AS ORDERED. MED
COMPLIANT. NO AGGRESSION NOTED. REDIRECT AND REORIENT AS NEEDED. FALL
PRECAUTIONS IN PLACE. WILL CPOC.

## 2020-01-08 VITALS — SYSTOLIC BLOOD PRESSURE: 124 MMHG | DIASTOLIC BLOOD PRESSURE: 72 MMHG

## 2020-01-08 VITALS — SYSTOLIC BLOOD PRESSURE: 117 MMHG | DIASTOLIC BLOOD PRESSURE: 78 MMHG

## 2020-01-08 NOTE — PN
PATIENT:RAGHU TURNER                           MEDICAL RECORD: [id number]
                                                         [facility]
                                                         ADMISSION DATE: 12/23/19
 
PROGRESS NOTE
 
 
DATE OF SERVICE:  01/07/2020
 
SUBJECTIVE:  The patient's case was discussed with staff.  He has no new
complaint.
 
OBJECTIVE:  The patient did not sleep very well last night.  I am not sure why. 
He is eating very well.  He has not been excessively disruptive.
 
ASSESSMENT:  Dementia.
 
PLAN:  Current medicines have been reviewed and will be maintained.  Long-term
prognosis is guarded.
 
TRANSINT:[id number] Voice Confirmation ID: [id number] DOCUMENT ID: [id number]
 
 
 
 
                                           
                                           FREDDIE RODRIGUEZ MD             
 
 
 
Electronically Signed by FREDDIE RODRIGUEZ on 01/08/20 at 1538
 
 
 
 
 
 
 
 
 
 
 
 
 
 
 
 
 
 
 
 
CC:                                                             [id number]
DICTATION DATE: 01/07/20 1305     :     01/07/20 2334      ADM IN  
                                                                              
[id number] [place] [address]

## 2020-01-08 NOTE — NUR
PT IS AWAKE AND ALERT TO SELF ONLY. CALM AND COOPERATIVE WITH ASSESSMENT.
PRESCRIBED MEDS PROVIDED AS ORDERED. MED COMPLIANT, MEDS CRUSHED IN PUDDING.
REDIRECT AND REORIENT AS NEEDED. NO AGGRESSION NOTED AT THIS TIME. FALL
PRECAUTIONS IN PLACE. WILL CPOC.

## 2020-01-09 VITALS — SYSTOLIC BLOOD PRESSURE: 118 MMHG | DIASTOLIC BLOOD PRESSURE: 65 MMHG

## 2020-01-09 NOTE — PN
PATIENT:RAGHU TURNER                           MEDICAL RECORD: [id number]
                                                         [facility]
                                                         ADMISSION DATE: 12/23/19
 
PROGRESS NOTE
 
 
DATE OF SERVICE:  01/08/2020
 
SUBJECTIVE:  The patient's case was discussed with staff.  He has no new
complaint.
 
OBJECTIVE:  The patient denies intent to harm himself or others.  He generally
tolerates his medicines well.
 
ASSESSMENT:  Dementia.
 
PLAN:  Brief supportive and educational interventions were made.  Current
medicines have been reviewed and will be maintained.  I anticipate he can be
transitioned out of the hospital soon if this level of improvement continues.
 
TRANSINT:[id number] Voice Confirmation ID: [id number] DOCUMENT ID: [id number]
 
 
 
 
                                           
                                           FREDDIE RODRIGUEZ MD             
 
 
 
Electronically Signed by FREDDIE RODRIGUEZ on 01/09/20 at 1512
 
 
 
 
 
 
 
 
 
 
 
 
 
 
 
 
 
 
 
CC:                                                             [id number]
DICTATION DATE: 01/08/20 1628     :     01/09/20 0244      ADM IN  
                                                                              
[facility]                                          
[address] [place] [address]

## 2020-01-09 NOTE — NUR
NUTRITION F/U
SPEECH NOTES REVIEWED. PER I&O  RECORDS PT WITH GOOD INTAKE RECENT MEALS.
WT UP ABOUT 2 # FROM ADMIT WT.
CURRENTLY ON MEGACE. ENSURE WITH [facility] SOFT DIET. RECENT BM RECORDED ON 1/9.
WILL CONTINUE TO PROVIDE DIET AND ENSURE. MONITOR PO INTAKE AND WT.
RD FOLLOWING

## 2020-01-09 NOTE — NUR
B.) PT IS ALERT AND ORIENTED TO SELF ONLY. HE HAS POOR INSIGHT INTO HIS
SITUATION. HE IS NOT ABLE TO AMBULATE WITHOUT ASSISTANCE. HE IS RECEIVED IN
THE DAYROOM IN HIS MARC-CHAIR. HE IS PLEASANT WITH STAFF AND ISNT YELLING OUT
AS MUCH.
I.) PROVIDED PM MEDICATIONS. REDIRECT OFTEN.
R.) COMPLIANT WITH ALL MEDICATIONS. DIFFICULT TO REDIRECT.
P.) WILL CONTINUE TO MONITOR.

## 2020-01-09 NOTE — NUR
ALERT, CALM, CONFUSED, NO ADVERSE BEHAVIORS NOTED. COMPLIANT WITH MEDICATIONS.
CONT POC AS DIRECTED.

## 2020-01-10 VITALS — SYSTOLIC BLOOD PRESSURE: 113 MMHG | DIASTOLIC BLOOD PRESSURE: 63 MMHG

## 2020-01-10 VITALS — SYSTOLIC BLOOD PRESSURE: 149 MMHG | DIASTOLIC BLOOD PRESSURE: 74 MMHG

## 2020-01-10 VITALS — SYSTOLIC BLOOD PRESSURE: 122 MMHG | DIASTOLIC BLOOD PRESSURE: 56 MMHG

## 2020-01-10 NOTE — NUR
PATIENT IS VERY CONFUSED, ONLY ORIENTED TO PERSON, HOLLERS OUT CONSTANTLY, HE
HAS STARTED THIS EVENING WITH HOLLERING AND MAKING DEMANDS, WATER, JELLO, ANY
AND EVERYTHING, DEPENDENT UPON OTHERS FOR ALL NEEDS, COMPLIANT WITH MEDS. WILL
FOLLOW POC

## 2020-01-10 NOTE — NUR
PATIENT IS VERY CONFUSED, HOLLERS OUT CONSTANTLY, CAN NOT FOLLOW DIRECTION,
CONVERSATION, GETS AGITATED QUICKLY, COMPLIANT WITH MEDS, GAVE A ATIVAN 0.5 PO
AT 2230 WITH NO RESULTS. WILL MONITOR

## 2020-01-10 NOTE — PN
PATIENT:RAGHU TURNER                           MEDICAL RECORD: [id number]
                                                         [facility]ATION:[facility]
                                                         ADMISSION DATE: 12/23/19
 
PROGRESS NOTE
 
 
DATE OF SERVICE:  01/09/2020
 
SUBJECTIVE:  The patient's case was discussed with staff.  He has no new
complaint.
 
OBJECTIVE:  The patient denies he would seek to harm himself or others.  He is
tolerating his medicines well.  Eye contact is fair.
 
ASSESSMENT:  No change in diagnosis.
 
PLAN:  Current medicines have been reviewed.  When placement is arranged, he may
be discharged.
 
TRANSINT:[id number] Voice Confirmation ID: [id number] DOCUMENT ID: [id number]
 
 
 
 
                                           
                                           FREDDIE RODRIGUEZ MD             
 
 
 
Electronically Signed by FREDDIE RODRIGUEZ on 01/10/20 at 1548
 
 
 
 
 
 
 
 
 
 
 
 
 
 
 
 
 
 
 
 
CC:                                                             [id number]
DICTATION DATE: 01/09/20 1717     :     01/10/20 0339      ADM IN  
                                                                              
[facility]                                          
[address] [place] [address]

## 2020-01-10 NOTE — NUR
The patient is sleepy today because he stayed up all night. He is not
aggressive or yelling. Held his am meds as he is too sleepy. Provide
prescribed meds when he awakens. Continue POC.

## 2020-01-11 VITALS — SYSTOLIC BLOOD PRESSURE: 129 MMHG | DIASTOLIC BLOOD PRESSURE: 74 MMHG

## 2020-01-11 VITALS — SYSTOLIC BLOOD PRESSURE: 151 MMHG | DIASTOLIC BLOOD PRESSURE: 80 MMHG

## 2020-01-11 LAB
ALBUMIN SERPL-MCNC: 3.4 G/DL (ref 3.4–5)
ALP SERPL-CCNC: 132 U/L (ref 46–116)
ALT SERPL-CCNC: 38 U/L (ref 10–68)
ANION GAP SERPL CALC-SCNC: 13 MMOL/L (ref 8–16)
BASOPHILS NFR BLD AUTO: 0.2 % (ref 0–2)
BILIRUB SERPL-MCNC: 0.35 MG/DL (ref 0.2–1.3)
BUN SERPL-MCNC: 39 MG/DL (ref 7–18)
CALCIUM SERPL-MCNC: 9 MG/DL (ref 8.5–10.1)
CHLORIDE SERPL-SCNC: 107 MMOL/L (ref 98–107)
CO2 SERPL-SCNC: 26.2 MMOL/L (ref 21–32)
CREAT SERPL-MCNC: 1.5 MG/DL (ref 0.6–1.3)
EOSINOPHIL NFR BLD: 1 % (ref 0–7)
ERYTHROCYTE [DISTWIDTH] IN BLOOD BY AUTOMATED COUNT: 15.1 % (ref 11.5–14.5)
GLOBULIN SER-MCNC: 3.3 G/L
GLUCOSE SERPL-MCNC: 94 MG/DL (ref 74–106)
HCT VFR BLD CALC: 43.7 % (ref 42–54)
HGB BLD-MCNC: 14.6 G/DL (ref 13.5–17.5)
IMM GRANULOCYTES NFR BLD: 1.4 % (ref 0–5)
LYMPHOCYTES NFR BLD AUTO: 14.2 % (ref 15–50)
MCH RBC QN AUTO: 31.3 PG (ref 26–34)
MCHC RBC AUTO-ENTMCNC: 33.4 G/DL (ref 31–37)
MCV RBC: 93.6 FL (ref 80–100)
MONOCYTES NFR BLD: 4.9 % (ref 2–11)
NEUTROPHILS NFR BLD AUTO: 78.3 % (ref 40–80)
OSMOLALITY SERPL CALC.SUM OF ELEC: 291 MOSM/KG (ref 275–300)
PLATELET # BLD: 250 10X3/UL (ref 130–400)
PMV BLD AUTO: 9.7 FL (ref 7.4–10.4)
POTASSIUM SERPL-SCNC: 4.2 MMOL/L (ref 3.5–5.1)
PROT SERPL-MCNC: 6.7 G/DL (ref 6.4–8.2)
RBC # BLD AUTO: 4.67 10X6/UL (ref 4.2–6.1)
SODIUM SERPL-SCNC: 142 MMOL/L (ref 136–145)
WBC # BLD AUTO: 13.2 10X3/UL (ref 4.8–10.8)

## 2020-01-11 NOTE — NUR
PATIENT IS CONFUSED, NO CHANGE, DEMANDING, ALWAYS HAS REQUESTS FOR VARIOUS
THINGS, COMPLIANT WITH MEDS, CAN BE PLEASANT, IS GRATEFUL FOR WHAT IS DONE FOR
HIM. WILL FOLLOW POC

## 2020-01-11 NOTE — NUR
The patient is demanding and he sleeps or he yelling. He is sitting in the
gerichair. Provide prescribed medications. The patient is compliant with meds.
Continue POC.

## 2020-01-11 NOTE — PN
PATIENT:RAGHU TURNER                           MEDICAL RECORD: [id number]
                                                         [facility]ATION:[facility]
                                                         ADMISSION DATE: 12/23/19
 
PROGRESS NOTE
 
 
DATE OF SERVICE:  01/10/2020
 
SUBJECTIVE:  The patient's case was discussed with staff.  He has no new
complaint.
 
OBJECTIVE:  The patient has been significantly agitated today.  He has very
limited insight about his situation.  He is not sleeping well for reasons that
are not entirely clear.  His thought processes are quite disorganized.
 
ASSESSMENT:  Dementia.
 
PLAN:  The patient will be given Geodon to assist with his disorganized thoughts
and disruptive aggressive behaviors.  His long-term prognosis is guarded.
 
TRANSINT:[id number] Voice Confirmation ID: [id number] DOCUMENT ID: [id number]
 
 
 
 
                                           
                                           FREDDIE RODRIGUEZ MD             
 
 
 
Electronically Signed by FREDDIE RODRIGUEZ on 01/11/20 at 1112
 
 
 
 
 
 
 
 
 
 
 
 
 
 
 
 
 
 
 
CC:                                                             [id number]
DICTATION DATE: 01/10/20 1709     :     01/10/20 0804      ADM IN  
                                                                              
[facility]                                          
[address] [place] [address]

## 2020-01-12 VITALS — SYSTOLIC BLOOD PRESSURE: 144 MMHG | DIASTOLIC BLOOD PRESSURE: 81 MMHG

## 2020-01-12 NOTE — PN
PATIENT:RAGHU TURNER                           MEDICAL RECORD: [id number]
                                                         [facility]ATION:[facility]
                                                         ADMISSION DATE: 12/23/19
 
PROGRESS NOTE
 
 
DATE OF SERVICE:  01/11/2020
 
SUBJECTIVE:  The patient's case was discussed with staff.  He has no new
complaint.
 
OBJECTIVE:  The patient was aggressive and required p.r.n. medication last
night.  He has no recollection of this.  He is a little bit sedated today. 
Long-term prognosis is guarded.
 
ASSESSMENT:  Vascular dementia.
 
PLAN:  I am going to check the patient's baseline labs and we will continue him
on current medicines.
 
TRANSINT:[id number] Voice Confirmation ID: [id number] DOCUMENT ID: [id number]
 
 
 
 
                                           
                                           FREDDIE RODRIGUEZ MD             
 
 
 
Electronically Signed by FREDDIE RODRIGUEZ on 01/12/20 at 1046
 
 
 
 
 
 
 
 
 
 
 
 
 
 
 
 
 
 
 
CC:                                                             [id number]
DICTATION DATE: 01/11/20 1225     :     01/11/20 1515      ADM IN  
                                                                              
[id number] [place] [id number]

## 2020-01-12 NOTE — NUR
PT SITTING IN W/C IN [place]. PT IS ALERT CONFUSED AND ORIENTED TO SELF ONLY.
PT YELLS OUT AT TIMES ASKING FOR STAFF TO COME HERE OR THAT HE NEEDS A GLASS
OF WATER. STAFF PROVIDES ITEMS REQUESTED AND PT YELLS OUT FOR SOME ITEM.
UNABLE TO REDIRECT YELLING AT TIMES. ENCOURAGEMENT GIVEN FOR PT TO NOT YELL
OUT FOR ITEMS. PT COMPLIES AT TIMES. PT IS COMPLIANT WITH MEDS, VITALS AND
ASSESSMENTS. PT CAN MAKE SOME NEEDS KNOWN. PT DID NOT HAVE BEHAVIORS ON
PREVIOUS SHIFT. EGG CRATE IN PLACE DUE TO REDNESS NOTED TO BUTTOCK AREA. CHAIR
ALARM IN PLACE AND ACTIVE. WILL CONT PLAN OF CARE.

## 2020-01-12 NOTE — NUR
NO CHANGE AT ALL, PATIENT IS STILL HOLLERING OUT, "WANTS THIS, WANTS THAT".
COMPLIANT WITH MEDS, HOWEVER HE HAS COMPASSION FOR OTHERS AND CAN BE
PLEASANAT. WILL MONITOR

## 2020-01-13 ENCOUNTER — HOSPITAL ENCOUNTER (EMERGENCY)
Dept: HOSPITAL 84 - D.ER | Age: 85
Discharge: OTHER INSTITUTION | End: 2020-01-13
Payer: SELF-PAY

## 2020-01-13 VITALS — SYSTOLIC BLOOD PRESSURE: 114 MMHG | DIASTOLIC BLOOD PRESSURE: 64 MMHG

## 2020-01-13 VITALS
BODY MASS INDEX: 23.15 KG/M2 | WEIGHT: 165.35 LBS | HEIGHT: 71 IN | WEIGHT: 165.35 LBS | HEIGHT: 71 IN | BODY MASS INDEX: 23.15 KG/M2

## 2020-01-13 VITALS — DIASTOLIC BLOOD PRESSURE: 69 MMHG | SYSTOLIC BLOOD PRESSURE: 120 MMHG

## 2020-01-13 VITALS — SYSTOLIC BLOOD PRESSURE: 140 MMHG | DIASTOLIC BLOOD PRESSURE: 76 MMHG

## 2020-01-13 VITALS — SYSTOLIC BLOOD PRESSURE: 129 MMHG | DIASTOLIC BLOOD PRESSURE: 68 MMHG

## 2020-01-13 DIAGNOSIS — F02.80: ICD-10-CM

## 2020-01-13 DIAGNOSIS — S01.81XA: Primary | ICD-10-CM

## 2020-01-13 DIAGNOSIS — I10: ICD-10-CM
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
## 2020-01-13 DIAGNOSIS — I48.91: ICD-10-CM

## 2020-01-13 DIAGNOSIS — G30.9: ICD-10-CM
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
## 2020-01-13 NOTE — NUR
PT RETURNED BACK TO SENIOR CARE FROM ER WITH RN PRESENT. BRUISING NOTED TO
RIGHT EYE AND RIGHT CHEEK BONE. STERI STRIPS IN PLACE. FAMILY NOTIFIED. PT
DENIES ANY PAIN OR DISCOMFORT AT THIS TIME. NEURO CHECKS CONTINUED.

## 2020-01-13 NOTE — PN
PATIENT:RAGHU TURNER                           MEDICAL RECORD: [id number]
                                                         [facility]ATION:[facility]
                                                         ADMISSION DATE: 12/23/19
 
PROGRESS NOTE
 
 
DATE OF SERVICE:  01/12/2020
 
SUBJECTIVE:  The patient's case was discussed with staff.  He has no new
complaint.
 
OBJECTIVE:  The patient has pretty limited insight about his situation.  He is
tolerating his medicines well.  He does have an elevated white count, which I
will leave to the internist to address.  I would be concerned about possible
infection.  He is not febrile at this point.
 
ASSESSMENT:  Vascular dementia.
 
PLAN:  From a behavioral standpoint, I think we are at or very close to his
baseline level of functioning and would be ready for discharge.  Unfortunately,
there seems to be some medical concerns that may need to be addressed.  I will
leave those to the appropriate specialist.
 
TRANSINT:[id number] Voice Confirmation ID: [id number] DOCUMENT ID: [id number]
 
 
 
 
                                           
                                           FREDDIE RODRIGUEZ MD             
 
 
 
Electronically Signed by FREDDIE RODRIGUEZ on 01/13/20 at 1323
 
 
 
 
 
 
 
 
 
 
 
 
 
 
 
 
CC:                                                             [id number]
DICTATION DATE: 01/12/20 1220     :     01/13/20 0349      ADM IN  
                                                                              
[facility]                                          
[address] [id number]

## 2020-01-13 NOTE — NUR
THIS NURSE HEARD LOUD NOISE AND ALARM GOING OFF IN DINING ROOM. THIS NURSE
WENT TO ASSESS THE SITUATION,  PT WAS NOTED ON THE DINING ROOM FLOOR WITH
BLOOD NOTED WITH LACERATION TO FOREHEAD. NURSE IMMEDIATLEY APPLIED PRESSURE TO
STOP BLEEDING. [facility] [employer] WAS CALLED TO HELP TRANSFER PT BACK TO CHAIR. PT
SENT TO ER FOR EVAL AND TREATMENT. HOUSE SUPERVISOR NOTIFIED. FAMILY JANETH
SHEETS [phone] NOTIFIED. DR. CARDENAS NOTIFIED.

## 2020-01-14 VITALS — SYSTOLIC BLOOD PRESSURE: 136 MMHG | DIASTOLIC BLOOD PRESSURE: 67 MMHG

## 2020-01-14 VITALS — SYSTOLIC BLOOD PRESSURE: 161 MMHG | DIASTOLIC BLOOD PRESSURE: 89 MMHG

## 2020-01-14 NOTE — NUR
PT IS AWAKE AND ALERT TO PERSON ONLY. PT HAS LITTLE INSIGHT INTO HIS
SITUATION. CALM AND COOPERATIVE WITH ASSESSMENT. PRESCRIBED MEDS PROVIDED AS
ORDERED. MED COMPLIANT. BRUISING NOTED TO RIGHT EYE AND RIGH CHEEK BONE. STERI
STRIPS IN PLACE. NO S/SX OF INFECTION NOTED. REDIRECT AND REORIENT AS NEEDED.
PT DOES YELL OUT AT TIMES FOR NO REASON. FALL PRECAUTIONS IN PLACE. WILL CPOC.

## 2020-01-14 NOTE — PN
PATIENT:RAGHU TURNER                           MEDICAL RECORD: [id number]
                                                         LOCATION:[facility]          
                                                         ADMISSION DATE: 01/13/20
 
PROGRESS NOTE
 
 
DATE OF SERVICE:  01/13/2020
 
SUBJECTIVE:  The patient's case was discussed with staff.  He has no new
complaint.
 
OBJECTIVE:  The patient is impaired cognitively.  He is unable to adequately
follow reasonable directions.  He has been told numerous times not to get out of
the wheelchair, but today he did while staff had backs turned to him and he fell
and cut his forehead open.  I happened to be there when this happened and he was
transported to the Emergency Room where Steri-Strips were used to close the gap
and a head CT was performed with no evidence of trauma.  Unfortunately, the
patient has almost no insight about his situation and is quite difficult to
redirect.  At this point, I am going to discontinue his Xanax and we will start
him on a low dose of Klonopin for his anxiety.  He will be monitored for
clinical changes associated with its use.
 
TRANSINT:[id number] Voice Confirmation ID: [id number] DOCUMENT ID: [id number]
 
 
 
 
                                           
                                           FREDDIE RODRIGUEZ MD             
 
 
 
Electronically Signed by FREDDIE RODRIGUEZ on 01/14/20 at 1520
 
 
 
 
 
 
 
 
 
 
 
 
 
 
 
 
 
CC:                                                             [id number]
DICTATION DATE: 01/13/20 1654     :     01/13/20 2346      DEP ER  
                                                                      01/13/20
[id number] [place] [address]

## 2020-01-14 NOTE — NUR
Nutrition Follow-up:
Diet: Regular [facility] Soft, Chopped meats + Ensure with meals and inbetween
PO intake: ~52% average x last 6 meals
Last BM: 1/14/20 x 2. WT: 165# (1/13/20); Admit wt: 154# (12/24/19)
Meds noted: megace, linzess. No new labs.
Recommend continue current PO diet or per SLP. Continue oral nutrition
supplements and appetite stimulant as medically feasible. RD following.

## 2020-01-14 NOTE — NUR
B.) PT IS ALERT AND ORIENTED TO SELF ONLY. HE HAS POOR INSIGHT INTO HIS
SITUATION. HE IS UNABLE TO AMBULATE WITHOUT ASSISTANCE. HE OFTEN YELLS OUT
WHEN HE NEEDS SOMETHING.
I.) PROVIDED PM MEDICATIONS AS PRESCRIBED. REDIRECT OFTEN.
R.) COMPLIANT WITH ALL MEDICATIONS. CAN BE DIFFICULT TO REDIRECT AT TIMES.
P.) WILL CONTINUE TO MONITOR.

## 2020-01-14 NOTE — NUR
REC'D PATIENT SITTING IN A CHAIR IN THE DAYROOM. ORIENTED TO SELF ONLY.
DEMANDING. YELLS TO GET HIS WAY. DISRUPTIVE TO UNIT MILEU. ADMINISTER MEDS AND
MONITOR COMPLIANCE. REDIRECT FOR DISRUPTIVE BEHAVIOR. MED COMPLIANT. POOR
REDIRECTION. CONTINUES TO BE DEMANDING AND YELLING DISRUPTING THE UNIT.
CONTINUE POC AND PROVIDE SAFE ENVIRONMENT.

## 2020-01-15 VITALS — SYSTOLIC BLOOD PRESSURE: 132 MMHG | DIASTOLIC BLOOD PRESSURE: 72 MMHG

## 2020-01-15 VITALS — SYSTOLIC BLOOD PRESSURE: 170 MMHG | DIASTOLIC BLOOD PRESSURE: 80 MMHG

## 2020-01-15 NOTE — NUR
CONFUSED AND DISORIENTED.COMPLIANT WITH MEDS.DIFFICULT TO REDIRECT AT
TIMES.POOR INSITE AS TO WHY HE IS HERE.LACERATION TO RT FOREHEAD HEALING WELL
,HAS PULLED STERI STRIPS OFF.BRUISING TO RT EYE AND CHEEK.WILL CONTINUE WITH
CURRENT PLAN OF CARE,MONITOR FOR CHANGES AND SAFETY.

## 2020-01-15 NOTE — PN
PATIENT:RAGHU TURNER                           MEDICAL RECORD: [id number]
                                                         [facility]
                                                         ADMISSION DATE: 12/23/19
 
PROGRESS NOTE
 
 
DATE OF SERVICE:  01/14/2020
 
SUBJECTIVE:  The patient's case was discussed with staff.  He has no new
complaint.
 
OBJECTIVE:  The patient took a nasty fall yesterday.  He has a cut on his
forehead, but no neurologic or structural damage.  He is actually fairly calm
today and following directions better than he has been.
 
ASSESSMENT:  Vascular dementia.
 
PLAN:  Current medicines have been reviewed and will be maintained.  Long-term
prognosis is guarded.
 
TRANSINT:[id number] Voice Confirmation ID: [id number] DOCUMENT ID: [id number]
 
 
 
 
                                           
                                           FREDDIE RODRIGUEZ MD             
 
 
 
Electronically Signed by FREDDIE RODRIGUEZ on 01/15/20 at 1134
 
 
 
 
 
 
 
 
 
 
 
 
 
 
 
 
 
 
 
CC:                                                             [id number]
DICTATION DATE: 01/14/20 1543     :     01/14/20 2321      ADM IN  
                                                                              
[facility]                                          
[address] [id number]

## 2020-01-16 VITALS — DIASTOLIC BLOOD PRESSURE: 60 MMHG | SYSTOLIC BLOOD PRESSURE: 160 MMHG

## 2020-01-16 VITALS — DIASTOLIC BLOOD PRESSURE: 80 MMHG | SYSTOLIC BLOOD PRESSURE: 109 MMHG

## 2020-01-16 NOTE — NUR
B)RECEIVED SITTING IN THE DAYROOM. CONFUSED AND DISORIENTED. PATIENT YELLING
FOR HELP RELATING "I WANT TO DIE. JUST LET ME DIE. HELP ME DIE." SEXUALLY
INAPPROPRIATE FOR EXAMPLE PATIENT TOLD MHT "LET ME LIP YOU NIPPLES. I'LL LICK
THEM LIKE THEY HAVE NEVER BEEN LICKED BEFORE. I'M GOING TO LICK YOUR YOU KNOW
WHAT, YOUR FRUITY [place]OTIE. LET'S JUST TAKE CARE OF EACH OTHER."
I)ADMINISTER MEDS AND MONITOR COMPLIANCE. REDIRECT FOR SEXUALLY INAPPROPRIATE
BEHAVIOR AND STATEMENTS OF WANTING TO DIE.
R)MED COMPLIANT. POOR REDIRECTION. CONTINUES TO YELL "I'M GOING TO DIE. JUST
LET ME DIE. HELP ME DIE."
P)CONTINUE POC AND PROVIDE SAFE ENVIRONMENT.

## 2020-01-16 NOTE — NUR
B)RECEIVED PATIENT SITTING IN THE DAYROOM. CONFUSED AND DISORIENTED. RELATED
HE IS IN "[place], GOING THROUGH REHAB FOR FALLING AND HITTING MY HEAD."
KEEPS TELLING THE NURSE TO CALL THE RV PLACE AND THEY WILL BUY THESE RVS AND
THAT WE CAN MAKE A LOT OF MONEY. DEMANDING AND YELLS FOR ATTENTION AND TO GET
WHAT HE WANTS. PATIENT CAN HAVE WATER AND HE WILL YELL FOR WATER AND RELATE
"I'M DYING. I NEED WATER."
I)ADMINISTER MEDS AND MONITOR COMPLIANCE. REDIRECT FOR YELLING DISRUPTIVE
BEHAVIOR.
R)MED COMPLIANT. POOR REDIRECTION. DIFFICULTY FOLLOWING INSTRUCTIONS.
P)CONTINUE POC AND PROVIDE SAFE ENVIRONMENT.

## 2020-01-16 NOTE — NUR
CONTINUES TO YELL AND DISRUPT UNIT. WILL NOT REDIRECT TO VERBAL REDIRECTION.
HALDOL AND ATIVAN IM ADMINISTERED BY BALDO RIDER RN PER PHYSICIANS ORDERS.

## 2020-01-16 NOTE — NUR
PT WAS YELLING AND  WAS FOUND LAYING IN THE FLOOR. PT RELATED TO NURSE
"I DON'T REMEMBER FALLING." DENIES PAIN. ABRASION NOTED TO UPPER MID BACK AND
BACK OF NECK AT HAIR LINE. ALSO TOLD NURSE HE TURNED HIS ALARM OFF. HOUSE
SUPERVISOR, EMERGENCY CONTACT JANETH BASS CONTACTED AND DR THERESA BENNETT. VS
97.3, 152/77, 71, 20 AND O2 SAT 99% UNIT MANAGER NOTED. NEURO CHECKS
INITIATED.

## 2020-01-16 NOTE — NUR
NUTRITION F/U
PT REMAINS ON REG [facility] SOFT DIET. ENSURE WITH MEALS. 100% INTAKE RECENT MEALS.
BM RECORDED ON 1/16/20. NOTE SOME WT INCREASE. PT CURRENTLY ON MEGACE.
WILL CONTINUE TO PROVIDE DIET AND ENSURE. MONITOR PO INTAKE AND WT.
RD FOLLOWING

## 2020-01-16 NOTE — NUR
The patient is awake and alert today, he is pleasant, but demanding. He is
trying to get out of the chair. He is not listening to staff when redirected.
Provide prescribed meds. The patient is compliant with meds. Continue POC.

## 2020-01-16 NOTE — PN
PATIENT:RAGHU TURNER                           MEDICAL RECORD: [id number]
                                                         [facility]ATION:[facility]
                                                         ADMISSION DATE: 12/23/19
 
PROGRESS NOTE
 
 
DATE OF SERVICE:  01/15/2020
 
SUBJECTIVE:  The patient's case was discussed with staff.  He has no new
complaint.
 
OBJECTIVE:  The patient denies intent to harm himself or others.  He generally
tolerates his medicines well.
 
ASSESSMENT:  Vascular dementia.
 
PLAN:  The patient is poorly oriented, but in good behavioral control.  I
anticipate he can be transitioned out of the hospital once placement is
arranged.  There are complicating factors associated with that.  I would refer
you to the  discharge planner's notes.
 
TRANSINT:[id number] Voice Confirmation ID: [id number] DOCUMENT ID: [id number]
 
 
 
 
                                           
                                           FREDDIE RODRIGUEZ MD             
 
 
 
Electronically Signed by FREDDIE RODRIGUEZ on 01/16/20 at 1555
 
 
 
 
 
 
 
 
 
 
 
 
 
 
 
 
 
 
CC:                                                             [id number]
DICTATION DATE: 01/15/20 1637     :     01/16/20 0228      ADM IN  
                                                                              
[id number] [place] [address]

## 2020-01-16 NOTE — NUR
YELLING AND DEMANDING STAFF WAIT ON HIM IMMEDIATELY. TRYING TO GET OUT OF
CHAIR HOWEVER IS UNSTEADY AND HAS HAD TWO RECENT FALLS. WOULD NOT REDIRECT TO
VERBAL INSTRUCTIONS. DISRUPTIVE. HALDOL AND ATIVAN IM ADMINISTERED IM PER
PHYSICIAN ORDERS.

## 2020-01-17 VITALS — SYSTOLIC BLOOD PRESSURE: 159 MMHG | DIASTOLIC BLOOD PRESSURE: 82 MMHG

## 2020-01-17 NOTE — NUR
EMS CAME TO TRANSPORT PATIENT TO [facility]. PT TOLERATED WELL AND STABLE
AT TIME OF DISCHARGE. PERSONAL BELONGINGS SENT WITH PT AND FAXED COPY TO
NURSING HOME. PAPER COPY SENT WITH PT. REPORT CALLED.

## 2020-01-17 NOTE — NUR
Called [facility] and they say they will pick the patient up within 45 minutes to
an hour. Calling the facility [place] to let them be aware.

## 2024-04-02 NOTE — PN
PATIENT:RAGHU TURNER                           MEDICAL RECORD: [id number]
                                                         LOCATION:[facility]
                                                         ADMISSION DATE: 12/23/19
 
PROGRESS NOTE
 
 
DATE OF SERVICE:  01/16/2020
 
SUBJECTIVE:  The patient's case was discussed with staff.  He has no new
complaint.
 
OBJECTIVE:  The patient denies intent to harm himself or others.  He is
tolerating his medicines well.  He is calmer and more cooperative.
 
ASSESSMENT:  Vascular dementia.
 
PLAN:  The patient will be transitioned to a nursing home tomorrow.  Long-term
prognosis is guarded.
 
TRANSINT:[id number] Voice Confirmation ID: [id number] DOCUMENT ID: [id number]
 
 
 
 
                                           
                                           FREDDIE RODRIGUEZ MD             
 
 
 
Electronically Signed by FREDDIE RODRIGUEZ on 01/17/20 at 1420
 
 
 
 
 
 
 
 
 
 
 
 
 
 
 
 
 
 
 
 
CC:                                                             [id number]
DICTATION DATE: 01/16/20 1632     :     01/16/20 1800      DIS IN  
                                                                      01/17/20
[id number] [place] [address] Attending and PA/NP shared services statement (NON-critical care):